# Patient Record
Sex: MALE | Race: OTHER | HISPANIC OR LATINO | URBAN - METROPOLITAN AREA
[De-identification: names, ages, dates, MRNs, and addresses within clinical notes are randomized per-mention and may not be internally consistent; named-entity substitution may affect disease eponyms.]

---

## 2017-12-09 ENCOUNTER — APPOINTMENT (RX ONLY)
Dept: URBAN - METROPOLITAN AREA CLINIC 15 | Facility: CLINIC | Age: 37
Setting detail: DERMATOLOGY
End: 2017-12-09

## 2017-12-09 DIAGNOSIS — Z41.9 ENCOUNTER FOR PROCEDURE FOR PURPOSES OTHER THAN REMEDYING HEALTH STATE, UNSPECIFIED: ICD-10-CM

## 2017-12-09 PROBLEM — B20 HUMAN IMMUNODEFICIENCY VIRUS [HIV] DISEASE: Status: ACTIVE | Noted: 2017-12-09

## 2017-12-09 PROCEDURE — ? COSMETIC CONSULTATION: FILLERS

## 2017-12-09 PROCEDURE — ? MEDICAL CONSULTATION: DYNAMIC RHYTIDES

## 2017-12-09 PROCEDURE — ? MEDICAL CONSULTATION: COOLSCULPTING

## 2020-08-08 ENCOUNTER — APPOINTMENT (RX ONLY)
Dept: URBAN - METROPOLITAN AREA CLINIC 15 | Facility: CLINIC | Age: 40
Setting detail: DERMATOLOGY
End: 2020-08-08

## 2020-08-08 DIAGNOSIS — Z41.9 ENCOUNTER FOR PROCEDURE FOR PURPOSES OTHER THAN REMEDYING HEALTH STATE, UNSPECIFIED: ICD-10-CM

## 2020-08-08 PROCEDURE — ? FILLERS

## 2020-08-08 PROCEDURE — ? BOTOX

## 2020-08-08 ASSESSMENT — LOCATION ZONE DERM: LOCATION ZONE: FACE

## 2020-08-08 ASSESSMENT — LOCATION DETAILED DESCRIPTION DERM
LOCATION DETAILED: RIGHT FOREHEAD
LOCATION DETAILED: LEFT FOREHEAD
LOCATION DETAILED: GLABELLA
LOCATION DETAILED: LEFT MEDIAL FOREHEAD
LOCATION DETAILED: RIGHT MEDIAL FOREHEAD
LOCATION DETAILED: RIGHT INFERIOR FOREHEAD

## 2020-08-08 ASSESSMENT — LOCATION SIMPLE DESCRIPTION DERM
LOCATION SIMPLE: GLABELLA
LOCATION SIMPLE: RIGHT FOREHEAD
LOCATION SIMPLE: LEFT FOREHEAD

## 2020-08-08 NOTE — HPI: COSMETIC (FILLERS)
Have You Had Fillers Before?: has not had fillers
Additional History: Patient quoted j ultra to nasolabial folds $600 and 1-2 radiesse (1 syringe $750 and 2 syringes $1450)

## 2020-08-08 NOTE — PROCEDURE: BOTOX
Expiration Date (Month Year): 04/2023
Show Topical Anesthesia: Yes
Depressor Anguli Oris Units: 0
Show Right And Left Periorbital Units: No
Price (Use Numbers Only, No Special Characters Or $): [address]
Forehead Units: 25
Detail Level: Zone
Lot #: [id number]
Additional Area 1 Location: chin
Dilution (U/0.1 Cc): 4
Consent: Written consent obtained. Risks include but not limited to lid/brow ptosis, bruising, swelling, diplopia, temporary effect, incomplete chemical denervation.
Post-Care Instructions: Patient instructed to not lie down for 4 hours and limit physical activity for 24 hours. Patient instructed not to travel by airplane for 48 hours.

## 2020-08-08 NOTE — PROCEDURE: FILLERS
Additional Area 2 Volume In Cc: 0
Include Cannula Information In Note?: No
Additional Area 2 Location: Left Nasolabial folds
Lot #: [id number]
Filler: Radiesse
Additional Area 3 Location: chin
Additional Area 1 Location: lip
Expiration Date (Month Year): 2021-06-20
Additional Area 1 Location: lips
Post-Care Instructions: Patient instructed to apply ice to reduce swelling and return with any issues.
Additional Area 2 Location: nose
Detail Level: Zone
Include Cannula Brand?: DermaSculpt
Additional Area 2 Location: corner of the mouth
Filler: Juvederm Ultra
Lot #: [id number]
Consent: Written consent obtained. Risks include but not limited to bruising, beading, irregular texture, ulceration, infection, allergic reaction, scar formation, incomplete augmentation, temporary nature, procedural pain.
Mid Face Filler  Volume In Cc: 1.5
Price (Use Numbers Only, No Special Characters Or $): Jesus An
Use Map Statement For Sites (Optional): Yes
Expiration Date (Month Year): 2022-07-15
Include Cannula Size?: 25G
Nasolabial Folds Filler Volume In Cc: 1
Include Cannula Length?: 1.5 inch
Map Statment: See [address] for Complete Details
Lot #: [id number]
Additional Area 5 Location: left tear through
Expiration Date (Month Year): 2021-09-27

## 2020-08-26 ENCOUNTER — APPOINTMENT (RX ONLY)
Dept: URBAN - METROPOLITAN AREA CLINIC 15 | Facility: CLINIC | Age: 40
Setting detail: DERMATOLOGY
End: 2020-08-26

## 2020-08-26 DIAGNOSIS — Z41.9 ENCOUNTER FOR PROCEDURE FOR PURPOSES OTHER THAN REMEDYING HEALTH STATE, UNSPECIFIED: ICD-10-CM

## 2020-08-26 PROCEDURE — ? ADDITIONAL NOTES

## 2020-08-26 PROCEDURE — ? BOTOX

## 2020-08-26 ASSESSMENT — LOCATION DETAILED DESCRIPTION DERM
LOCATION DETAILED: LEFT FOREHEAD
LOCATION DETAILED: LEFT INFERIOR MEDIAL FOREHEAD
LOCATION DETAILED: RIGHT LATERAL FOREHEAD

## 2020-08-26 ASSESSMENT — LOCATION ZONE DERM: LOCATION ZONE: FACE

## 2020-08-26 ASSESSMENT — LOCATION SIMPLE DESCRIPTION DERM
LOCATION SIMPLE: LEFT FOREHEAD
LOCATION SIMPLE: RIGHT FOREHEAD

## 2020-08-26 NOTE — PROCEDURE: BOTOX
Mentalis Units: 0
Show Nasal Units: Yes
Expiration Date (Month Year): 04/2023
Show Right And Left Brow Units: No
Consent: Written consent obtained. Risks include but not limited to lid/brow ptosis, bruising, swelling, diplopia, temporary effect, incomplete chemical denervation.
Forehead Units: 7.5
Detail Level: Zone
Glabellar Complex Units: 2.5
Lot #: [id number]
Additional Area 1 Location: chin
Post-Care Instructions: Patient instructed to not lie down for 4 hours and limit physical activity for 24 hours. Patient instructed not to travel by airplane for 48 hours.
Dilution (U/0.1 Cc): 2